# Patient Record
Sex: FEMALE | NOT HISPANIC OR LATINO | Employment: FULL TIME | URBAN - METROPOLITAN AREA
[De-identification: names, ages, dates, MRNs, and addresses within clinical notes are randomized per-mention and may not be internally consistent; named-entity substitution may affect disease eponyms.]

---

## 2024-09-11 ENCOUNTER — OFFICE VISIT (OUTPATIENT)
Dept: FAMILY MEDICINE | Facility: CLINIC | Age: 44
End: 2024-09-11
Payer: COMMERCIAL

## 2024-09-11 VITALS
RESPIRATION RATE: 16 BRPM | HEART RATE: 99 BPM | HEIGHT: 64 IN | SYSTOLIC BLOOD PRESSURE: 119 MMHG | WEIGHT: 166.2 LBS | BODY MASS INDEX: 28.38 KG/M2 | TEMPERATURE: 98.6 F | DIASTOLIC BLOOD PRESSURE: 82 MMHG | OXYGEN SATURATION: 100 %

## 2024-09-11 DIAGNOSIS — E78.5 DYSLIPIDEMIA: ICD-10-CM

## 2024-09-11 DIAGNOSIS — I10 BENIGN ESSENTIAL HYPERTENSION: ICD-10-CM

## 2024-09-11 DIAGNOSIS — E11.9 TYPE 2 DIABETES MELLITUS WITHOUT COMPLICATION, WITHOUT LONG-TERM CURRENT USE OF INSULIN (H): Primary | ICD-10-CM

## 2024-09-11 LAB — HBA1C MFR BLD: 8.8 % (ref 0–5.6)

## 2024-09-11 PROCEDURE — 99214 OFFICE O/P EST MOD 30 MIN: CPT | Performed by: INTERNAL MEDICINE

## 2024-09-11 PROCEDURE — 36415 COLL VENOUS BLD VENIPUNCTURE: CPT | Performed by: INTERNAL MEDICINE

## 2024-09-11 PROCEDURE — 80053 COMPREHEN METABOLIC PANEL: CPT | Performed by: INTERNAL MEDICINE

## 2024-09-11 PROCEDURE — 83036 HEMOGLOBIN GLYCOSYLATED A1C: CPT | Performed by: INTERNAL MEDICINE

## 2024-09-11 PROCEDURE — 80061 LIPID PANEL: CPT | Performed by: INTERNAL MEDICINE

## 2024-09-11 RX ORDER — LOSARTAN POTASSIUM 50 MG/1
50 TABLET ORAL DAILY
Status: SHIPPED
Start: 2024-09-11

## 2024-09-11 RX ORDER — BLOOD-GLUCOSE SENSOR
1 EACH MISCELLANEOUS
Qty: 6 EACH | Refills: 5 | Status: SHIPPED | OUTPATIENT
Start: 2024-09-11

## 2024-09-11 RX ORDER — ATORVASTATIN CALCIUM 10 MG/1
10 TABLET, FILM COATED ORAL DAILY
Status: SHIPPED
Start: 2024-09-11

## 2024-09-11 RX ORDER — KETOROLAC TROMETHAMINE 30 MG/ML
1 INJECTION, SOLUTION INTRAMUSCULAR; INTRAVENOUS ONCE
Qty: 1 EACH | Refills: 0 | Status: SHIPPED | OUTPATIENT
Start: 2024-09-11 | End: 2024-09-11

## 2024-09-11 ASSESSMENT — PAIN SCALES - GENERAL: PAINLEVEL: NO PAIN (0)

## 2024-09-11 NOTE — PROGRESS NOTES
Assessment & Plan     Type 2 diabetes mellitus without complication, without long-term current use of insulin (H)  She initially had gestational diabetes around 7 years ago when she had her son  After that she was diagnosed with diabetes  She has been very well-controlled with metformin 1 g twice a day but for the past few weeks she thinks her diabetes is out of control  Sometimes her sugars have been in the range of 200  She has just moved to [place] and is in the process of settling down  Because of this she has not had much time to do exercise  Her A1c is 8.8 today  Discussed about diet and exercise  We also reviewed GLP-1 agonist but she is not keen on those as she has history of corneal transplant and ophthalmology issues  Discussed about Januvia  Discussed about the side effects of the same  We will start that  And we will also get her a CGM   she needs to see a nutritionist  Consult diabetic education  - metFORMIN (GLUCOPHAGE) 1000 MG tablet; Take 1 tablet (1,000 mg) by mouth 2 times daily (with meals).  - Hemoglobin A1c; Future  - Comprehensive metabolic panel (BMP + Alb, Alk Phos, ALT, AST, Total. Bili, TP); Future  - Hemoglobin A1c  - Comprehensive metabolic panel (BMP + Alb, Alk Phos, ALT, AST, Total. Bili, TP)  - Continuous Glucose  (FREESTYLE ILENE 3 READER) SUMAN; 1 each once for 1 dose. Use to read blood sugars per 's instructions.  - Continuous Glucose Sensor (FREESTYLE ILENE 3 SENSOR) MISC; 1 each every 14 days. Use 1 sensor every 14 days. Use to read blood sugars per 's instructions.  - sitagliptin (JANUVIA) 100 MG tablet; Take 1 tablet (100 mg) by mouth daily.  - Adult Diabetes Education  Referral; Future    Benign essential hypertension  Blood pressure under good control with losartan  - losartan (COZAAR) 50 MG tablet; Take 1 tablet (50 mg) by mouth daily.    Dyslipidemia  She is on Lipitor  For the history of A-fib/atrial tachycardia status post  "ablation in the past and also  had a history of tachycardia mediated cardiomyopathy  - atorvastatin (LIPITOR) 10 MG tablet; Take 1 tablet (10 mg) by mouth daily.  - Lipid panel reflex to direct LDL Fasting; Future  - Lipid panel reflex to direct LDL Fasting          BMI  Estimated body mass index is 28.53 kg/m  as calculated from the following:    Height as of this encounter: 1.626 m (5' 4\").    Weight as of this encounter: 75.4 kg (166 lb 3.2 oz).   Weight management plan: Discussed healthy diet and exercise guidelines          Hema Horvath is a 43 year old, presenting for the following health issues:  Establish Care        9/11/2024     3:15 PM   Additional Questions   Roomed by Richard   Accompanied by Self         9/11/2024     3:15 PM   Patient Reported Additional Medications   Patient reports taking the following new medications None     History of Present Illness       Reason for visit:  Establishing pcp and annual blood work   She is taking medications regularly.                 Review of Systems  Constitutional, HEENT, cardiovascular, pulmonary, gi and gu systems are negative, except as otherwise noted.      Objective    /82 (BP Location: Right arm, Patient Position: Sitting, Cuff Size: Adult Regular)   Pulse 99   Temp 98.6  F (37  C) (Tympanic)   Resp 16   Ht 1.626 m (5' 4\")   Wt 75.4 kg (166 lb 3.2 oz)   LMP 08/28/2024 (Exact Date)   SpO2 100%   BMI 28.53 kg/m    Body mass index is 28.53 kg/m .  Physical Exam   GENERAL: alert and no distress  EYES: Eyes grossly normal to inspection, PERRL and conjunctivae and sclerae normal  MS: no gross musculoskeletal defects noted, no edema  SKIN: no suspicious lesions or rashes  NEURO: Normal strength and tone, mentation intact and speech normal  PSYCH: mentation appears normal, affect normal/bright            Signed Electronically by: Roman Parada MD    "

## 2024-09-11 NOTE — LETTER
September 12, 2024      Yuliet Amaya  [address]  [place] [address]        Ms. Amaya,   Kidney function tests are normal   Bicarbonate  that is the carbon oxide is slightly low but of not any concern as this can be sometimes seen when someone is anxious and hyperventilating   Liver function tests are normal   Glucose is 185 as expected   Cholesterol panel shows elevated triglycerides at 170 on low good cholesterol HDL at 27 but LDL is good at 36   The triglycerides will come down if you work on diet and exercise     Please contact the clinic if you have additional questions.  Thank you.     Sincerely,     Roman Parada MD     Resulted Orders   Hemoglobin A1c   Result Value Ref Range    Hemoglobin A1C 8.8 (H) 0.0 - 5.6 %      Comment:      Normal <5.7%   Prediabetes 5.7-6.4%    Diabetes 6.5% or higher     Note: Adopted from ADA consensus guidelines.    Narrative    Results confirmed by repeat test.     Comprehensive metabolic panel (BMP + Alb, Alk Phos, ALT, AST, Total. Bili, TP)   Result Value Ref Range    Sodium 137 135 - 145 mmol/L    Potassium 4.5 3.4 - 5.3 mmol/L    Carbon Dioxide (CO2) 21 (L) 22 - 29 mmol/L    Anion Gap 12 7 - 15 mmol/L    Urea Nitrogen 5.3 (L) 6.0 - 20.0 mg/dL    Creatinine 0.49 (L) 0.51 - 0.95 mg/dL    GFR Estimate >90 >60 mL/min/1.73m2      Comment:      eGFR calculated using 2021 CKD-EPI equation.    Calcium 9.0 8.8 - 10.4 mg/dL      Comment:      Reference intervals for this test were updated on 7/16/2024 to reflect our healthy population more accurately. There may be differences in the flagging of prior results with similar values performed with this method. Those prior results can be interpreted in the context of the updated reference intervals.    Chloride 104 98 - 107 mmol/L    Glucose 185 (H) 70 - 99 mg/dL    Alkaline Phosphatase 87 40 - 150 U/L    AST 14 0 - 45 U/L    ALT 9 0 - 50 U/L    Protein Total 7.8 6.4 - 8.3 g/dL    Albumin 4.1 3.5 - 5.2 g/dL    Bilirubin Total  0.3 <=1.2 mg/dL    Patient Fasting > 8hrs? No    Lipid panel reflex to direct LDL Fasting   Result Value Ref Range    Cholesterol 97 <200 mg/dL    Triglycerides 170 (H) <150 mg/dL    Direct Measure HDL 27 (L) >=50 mg/dL    LDL Cholesterol Calculated 36 <100 mg/dL    Non HDL Cholesterol 70 <130 mg/dL    Patient Fasting > 8hrs? No     Narrative    Cholesterol  Desirable: < 200 mg/dL  Borderline High: 200 - 239 mg/dL  High: >= 240 mg/dL    Triglycerides  Normal: < 150 mg/dL  Borderline High: 150 - 199 mg/dL  High: 200-499 mg/dL  Very High: >= 500 mg/dL    Direct Measure HDL  Female: >= 50 mg/dL   Male: >= 40 mg/dL    LDL Cholesterol  Desirable: < 100 mg/dL  Above Desirable: 100 - 129 mg/dL   Borderline High: 130 - 159 mg/dL   High:  160 - 189 mg/dL   Very High: >= 190 mg/dL    Non HDL Cholesterol  Desirable: < 130 mg/dL  Above Desirable: 130 - 159 mg/dL  Borderline High: 160 - 189 mg/dL  High: 190 - 219 mg/dL  Very High: >= 220 mg/dL

## 2024-09-12 LAB
ALBUMIN SERPL BCG-MCNC: 4.1 G/DL (ref 3.5–5.2)
ALP SERPL-CCNC: 87 U/L (ref 40–150)
ALT SERPL W P-5'-P-CCNC: 9 U/L (ref 0–50)
ANION GAP SERPL CALCULATED.3IONS-SCNC: 12 MMOL/L (ref 7–15)
AST SERPL W P-5'-P-CCNC: 14 U/L (ref 0–45)
BILIRUB SERPL-MCNC: 0.3 MG/DL
BUN SERPL-MCNC: 5.3 MG/DL (ref 6–20)
CALCIUM SERPL-MCNC: 9 MG/DL (ref 8.8–10.4)
CHLORIDE SERPL-SCNC: 104 MMOL/L (ref 98–107)
CHOLEST SERPL-MCNC: 97 MG/DL
CREAT SERPL-MCNC: 0.49 MG/DL (ref 0.51–0.95)
EGFRCR SERPLBLD CKD-EPI 2021: >90 ML/MIN/1.73M2
FASTING STATUS PATIENT QL REPORTED: NO
FASTING STATUS PATIENT QL REPORTED: NO
GLUCOSE SERPL-MCNC: 185 MG/DL (ref 70–99)
HCO3 SERPL-SCNC: 21 MMOL/L (ref 22–29)
HDLC SERPL-MCNC: 27 MG/DL
LDLC SERPL CALC-MCNC: 36 MG/DL
NONHDLC SERPL-MCNC: 70 MG/DL
POTASSIUM SERPL-SCNC: 4.5 MMOL/L (ref 3.4–5.3)
PROT SERPL-MCNC: 7.8 G/DL (ref 6.4–8.3)
SODIUM SERPL-SCNC: 137 MMOL/L (ref 135–145)
TRIGL SERPL-MCNC: 170 MG/DL

## 2024-09-20 ENCOUNTER — VIRTUAL VISIT (OUTPATIENT)
Dept: EDUCATION SERVICES | Facility: CLINIC | Age: 44
End: 2024-09-20
Attending: INTERNAL MEDICINE
Payer: COMMERCIAL

## 2024-09-20 DIAGNOSIS — E11.9 TYPE 2 DIABETES MELLITUS WITHOUT COMPLICATION, WITHOUT LONG-TERM CURRENT USE OF INSULIN (H): ICD-10-CM

## 2024-09-20 PROCEDURE — G0108 DIAB MANAGE TRN  PER INDIV: HCPCS | Mod: 93 | Performed by: DIETITIAN, REGISTERED

## 2024-09-20 NOTE — PATIENT INSTRUCTIONS
"Download Ching 3 lauren and start sensor. To connect to the [facility]-->open the freestyle ching 3 lauren-->click on the three bars in the upper left hand corner-->scroll down to and click on \"connected apps\"-->click the blue connect button across from the [employer]w symbol-->enter the practice ID#[id number]-->join practice.   Nutrition goals: limit carbohydrates to 3-45 grams per meal and 15 grams per snack. Add in non-starchy vegetables with meals and a protein source.   Aim for a minimum of 60 oz of water on a daily basis.  Cookbook Resource: My Ethiopian Table by Bria Lomeli, she is a registered dietitian and diabetes educator. [email or web address]  Aim for a 5% reduction in weight over the next 3-6 months.  Goal for A1C is to reduce to < 7%.  Look at local gyms and community centers near your home to consider for membership.   Schedule a diabetic eye exam and dental cleaning if you have not had them completed in the past 12 months.   Take medications as prescribed. Note bowel habits after taking metformin.   "

## 2024-09-20 NOTE — LETTER
9/20/2024         RE: Yuliet Amaya  [address]  [place] [address]        Dear Colleague,    Thank you for referring your patient, Yuliet Amaya, to the [facility]. Please see a copy of my visit note below.    Diabetes Self-Management Education & Support/ 69 minutes    Presents for: Initial Assessment for new diagnosis    Type of Service: Telephone Visit    Originating Location (Patient Location): Home  Distant Location (Provider Location): [facility]  Mode of Communication:  Telephone    Telephone Visit Start Time:  12:55  Telephone Visit End Time (telephone visit stop time): 1:38    How would patient like to obtain AVS? Mail a copy      ASSESSMENT:  Initial DM education for new diagnosis 9/11/24. Yuliet has a history of GDM. She is currently taking Metformin 2000 mg, she has not been able to start Januvia due to cost. She is not currently checking BG at home, although she just received cora 3 sensors and plans to download the lauren and get started this weekend. Yuliet moved to [place] two weeks ago, she is in the midst of unpacking and she starts a new job on 9/23/24. She is currently not exercising, but hopes to find a gym nearby to join. Her stress level has been high with her recent move.Yuliet is consuming two meals/day, rice is a staple, but for the last week she has substitued whole grain tortillas at her meals. She is following a vegetarian meal plan: she does consume some milk in tea, cheese and butter, no other animnal products. Her main source of protein is from lentils and nuts, occasionally homemade paneer. Yuliet limits sweets to every 2-3 weeks, she does not drink sweet beverages. She has 1-2 tsp of sugar in her tea or coffee in the morning and at times in the afternoon. Yuliet does not use tobacco or ETOH products. She desires weight loss of 30#, per PCP she declined using a GLP1 at this time.     Patient's most recent   Lab Results  "  Component Value Date    A1C 8.8 09/11/2024     is not meeting goal of <8.0    Diabetes knowledge and skills assessment:   Patient is knowledgeable in diabetes management concepts related to: new diagnosis    Continue education with the following diabetes management concepts: Healthy Eating, Being Active, Monitoring, Taking Medication, and Reducing Risks    Based on learning assessment above, most appropriate setting for further diabetes education would be: Individual setting.      PLAN  Download [email or web address] 3 lauren and start sensor. To connect to the [facility]-->open the freestyle cora 3 lauren-->click on the three bars in the upper left hand corner-->scroll down to and click on \"connected apps\"-->click the blue connect button across from the Schedul[employer] symbol-->enter the practice ID#[id number]-->join practice.   Nutrition goals: limit carbohydrates to 3-45 grams per meal and 15 grams per snack. Add in non-starchy vegetables with meals and a protein source.   Aim for a minimum of 60 oz of water on a daily basis.  Cookbook Resource: My  Table by Bria Lomeli, she is a registered dietitian and diabetes educator. https://www.[employer]/  Aim for a 5% reduction in weight over the next 3-6 months.  Goal for A1C is to reduce to < 7%.  Look at local gyms and community centers near your home to consider for membership.   Schedule a diabetic eye exam and dental cleaning if you have not had them completed in the past 12 months.   Take medications as prescribed. Note bowel habits after taking metformin.     Topics to cover at upcoming visits: Healthy Eating, Being Active, Monitoring, Taking Medication, and Reducing Risks    Follow-up: 10/254/24    See Care Plan for co-developed, patient-state behavior change goals.  AVS provided for patient today.    Education Materials Provided:  Living Healthy with Diabetes, Carbohydrate Counting, My Plate Planner, and cho counting for traditional [place] " "foods      SUBJECTIVE/OBJECTIVE:  Presents for: Initial Assessment for new diagnosis  Accompanied by: Self  Focus of Visit: Monitoring, Taking Medication, Healthy Eating, Being Active  Diabetes type: Type 2  Date of diagnosis: 9/11/24  Transportation concerns: No  Other concerns:: None  Cultural Influences/Ethnic Background:  Not  or       Diabetes Symptoms & Complications:          Patient Problem List and Family Medical History reviewed for relevant medical history, current medical status, and diabetes risk factors.    Vitals:  LMP 08/28/2024 (Exact Date)   Estimated body mass index is 28.53 kg/m  as calculated from the following:    Height as of 9/11/24: 1.626 m (5' 4\").    Weight as of 9/11/24: 75.4 kg (166 lb 3.2 oz).   Last 3 BP:   BP Readings from Last 3 Encounters:   09/11/24 119/82       History   Smoking Status     Never   Smokeless Tobacco     Never       Labs:  Lab Results   Component Value Date    A1C 8.8 09/11/2024     Lab Results   Component Value Date     09/11/2024     Lab Results   Component Value Date    LDL 36 09/11/2024     Direct Measure HDL   Date Value Ref Range Status   09/11/2024 27 (L) >=50 mg/dL Final   ]  GFR Estimate   Date Value Ref Range Status   09/11/2024 >90 >60 mL/min/1.73m2 Final     Comment:     eGFR calculated using 2021 CKD-EPI equation.     No results found for: \"GFRESTBLACK\"  Lab Results   Component Value Date    CR 0.49 09/11/2024     No results found for: \"MICROALBUMIN\"    Healthy Eating:  Healthy Eating Assessed Today: Yes  Meals include: Breakfast, Lunch, Dinner  Breakfast: rarely had breakfast,  1 small cup of tea or coffee with 1-2tsp sugar  Lunch: 1-2:30pm: used to have a bigger meal-rice/curries/soups-vegetables or lentils, pickles/salt  last two weeks: 2-3 flour tortillas/salad(nuts), soup: tomato, pumpkin,  Dinner: simliar to noon meal, maybe a different correa  Snacks: 1 small cup of tea or coffee with 1-2 tsp sugar  Other: vegetarian: has some " milk in tea, some cheese, reduced caffeine, no more than one cup coffee/day, tried stevia, did not like it, cookies once in awhile  Beverages: Tea, Coffee    Being Active:  Being Active Assessed Today: Yes  Exercise:: Currently not exercising    Monitoring:  Monitoring Assessed Today: Yes (she is currently not checking BG at home, she just received cora 3 sensors and will start this weekend)  Did patient bring glucose meter to appointment? : No    NA    Taking Medications:  Diabetes Medication(s)       Biguanides       metFORMIN (GLUCOPHAGE) 1000 MG tablet Take 1 tablet (1,000 mg) by mouth 2 times daily (with meals).       Dipeptidyl Peptidase-4 (DPP-4) Inhibitors       sitagliptin (JANUVIA) 100 MG tablet Take 1 tablet (100 mg) by mouth daily.            Taking Medication Assessed Today: Yes  Current Treatments: Diet, Oral Medication (taken by mouth)  Problems taking diabetes medications regularly?: Yes (Januvia cost > $500, she will be receiving this medication from [facility])    Problem Solving:  Problem Solving Assessed Today: Yes  Is the patient at risk for hypoglycemia?: No              Reducing Risks:  Reducing Risks Assessed Today: Yes  Diabetes Risks: Age over 45 years, Sedentary Lifestyle, Ethnicity, History of gestational diabetes  Additional female risks: Gestational Diabetes    Healthy Coping:  Healthy Coping Assessed Today: Yes  Emotional response to diabetes: Ready to learn  Stage of change: PREPARATION (Decided to change - considering how)  Patient Activation Measure Survey Score:       No data to display                  Care Plan and Education Provided:  Healthy Eating: Carbohydrate Counting, Label reading, and Portion control, Being Active: Finding a physical activity routine that works for you, Monitoring: Blood glucose versus Continuous Glucose Monitoring, Taking Medication: Action of prescribed medication(s) and When to take medication(s), and Reducing Risks: Goal for A1c, how it relates to glucose  and how often to check        Time Spent: 69 minutes  Encounter Type: Individual    Any diabetes medication dose changes were made via the CDE Protocol per the patient's primary care provider. A copy of this encounter was shared with the provider.

## 2024-09-20 NOTE — PROGRESS NOTES
Diabetes Self-Management Education & Support/ 69 minutes    Presents for: Initial Assessment for new diagnosis    Type of Service: Telephone Visit    Originating Location (Patient Location): Home  Distant Location (Provider Location): [facility]  Mode of Communication:  Telephone    Telephone Visit Start Time:  12:55  Telephone Visit End Time (telephone visit stop time): 1:38    How would patient like to obtain AVS? Mail a copy      ASSESSMENT:  Initial DM education for new diagnosis 9/11/24. Yuliet has a history of GDM. She is currently taking Metformin 2000 mg, she has not been able to start Januvia due to cost. She is not currently checking BG at home, although she just received cora 3 sensors and plans to download the lauren and get started this weekend. Yuliet moved to [place] two weeks ago, she is in the midst of unpacking and she starts a new job on 9/23/24. She is currently not exercising, but hopes to find a gym nearby to join. Her stress level has been high with her recent move.Yuliet is consuming two meals/day, rice is a staple, but for the last week she has substitued whole grain tortillas at her meals. She is following a vegetarian meal plan: she does consume some milk in tea, cheese and butter, no other animnal products. Her main source of protein is from lentils and nuts, occasionally homemade paneer. Yuliet limits sweets to every 2-3 weeks, she does not drink sweet beverages. She has 1-2 tsp of sugar in her tea or coffee in the morning and at times in the afternoon. Yuliet does not use tobacco or ETOH products. She desires weight loss of 30#, per PCP she declined using a GLP1 at this time.     Patient's most recent   Lab Results   Component Value Date    A1C 8.8 09/11/2024     is not meeting goal of <8.0    Diabetes knowledge and skills assessment:   Patient is knowledgeable in diabetes management concepts related to: new diagnosis    Continue education with the following diabetes  "management concepts: Healthy Eating, Being Active, Monitoring, Taking Medication, and Reducing Risks    Based on learning assessment above, most appropriate setting for further diabetes education would be: Individual setting.      PLAN  Download [employer] 3 lauren and start sensor. To connect to the [facility]-->open the freestyle cora 3 lauren-->click on the three bars in the upper left hand corner-->scroll down to and click on \"connected apps\"-->click the blue connect button across from the [employer] symbol-->enter the practice ID#[id number]-->join practice.   Nutrition goals: limit carbohydrates to 3-45 grams per meal and 15 grams per snack. Add in non-starchy vegetables with meals and a protein source.   Aim for a minimum of 60 oz of water on a daily basis.  Cookbook Resource: My  Table by Bria Lomeli, she is a registered dietitian and diabetes educator. [email or web address]/  Aim for a 5% reduction in weight over the next 3-6 months.  Goal for A1C is to reduce to < 7%.  Look at local gyms and community centers near your home to consider for membership.   Schedule a diabetic eye exam and dental cleaning if you have not had them completed in the past 12 months.   Take medications as prescribed. Note bowel habits after taking metformin.     Topics to cover at upcoming visits: Healthy Eating, Being Active, Monitoring, Taking Medication, and Reducing Risks    Follow-up: 10/254/24    See Care Plan for co-developed, patient-state behavior change goals.  AVS provided for patient today.    Education Materials Provided:  Living Healthy with Diabetes, Carbohydrate Counting, My Plate Planner, and cho counting for traditional /pak[place] foods      SUBJECTIVE/OBJECTIVE:  Presents for: Initial Assessment for new diagnosis  Accompanied by: Self  Focus of Visit: Monitoring, Taking Medication, Healthy Eating, Being Active  Diabetes type: Type 2  Date of diagnosis: 9/11/24  Transportation concerns: " "No  Other concerns:: None  Cultural Influences/Ethnic Background:  Not  or       Diabetes Symptoms & Complications:          Patient Problem List and Family Medical History reviewed for relevant medical history, current medical status, and diabetes risk factors.    Vitals:  LMP 08/28/2024 (Exact Date)   Estimated body mass index is 28.53 kg/m  as calculated from the following:    Height as of 9/11/24: 1.626 m (5' 4\").    Weight as of 9/11/24: 75.4 kg (166 lb 3.2 oz).   Last 3 BP:   BP Readings from Last 3 Encounters:   09/11/24 119/82       History   Smoking Status    Never   Smokeless Tobacco    Never       Labs:  Lab Results   Component Value Date    A1C 8.8 09/11/2024     Lab Results   Component Value Date     09/11/2024     Lab Results   Component Value Date    LDL 36 09/11/2024     Direct Measure HDL   Date Value Ref Range Status   09/11/2024 27 (L) >=50 mg/dL Final   ]  GFR Estimate   Date Value Ref Range Status   09/11/2024 >90 >60 mL/min/1.73m2 Final     Comment:     eGFR calculated using 2021 CKD-EPI equation.     No results found for: \"GFRESTBLACK\"  Lab Results   Component Value Date    CR 0.49 09/11/2024     No results found for: \"MICROALBUMIN\"    Healthy Eating:  Healthy Eating Assessed Today: Yes  Meals include: Breakfast, Lunch, Dinner  Breakfast: rarely had breakfast,  1 small cup of tea or coffee with 1-2tsp sugar  Lunch: 1-2:30pm: used to have a bigger meal-rice/curries/soups-vegetables or lentils, pickles/salt  last two weeks: 2-3 flour tortillas/salad(nuts), soup: tomato, pumpkin,  Dinner: simliar to noon meal, maybe a different correa  Snacks: 1 small cup of tea or coffee with 1-2 tsp sugar  Other: vegetarian: has some milk in tea, some cheese, reduced caffeine, no more than one cup coffee/day, tried stevia, did not like it, cookies once in awhile  Beverages: Tea, Coffee    Being Active:  Being Active Assessed Today: Yes  Exercise:: Currently not " exercising    Monitoring:  Monitoring Assessed Today: Yes (she is currently not checking BG at home, she just received cora 3 sensors and will start this weekend)  Did patient bring glucose meter to appointment? : No    NA    Taking Medications:  Diabetes Medication(s)       Biguanides       metFORMIN (GLUCOPHAGE) 1000 MG tablet Take 1 tablet (1,000 mg) by mouth 2 times daily (with meals).       Dipeptidyl Peptidase-4 (DPP-4) Inhibitors       sitagliptin (JANUVIA) 100 MG tablet Take 1 tablet (100 mg) by mouth daily.            Taking Medication Assessed Today: Yes  Current Treatments: Diet, Oral Medication (taken by mouth)  Problems taking diabetes medications regularly?: Yes (Januvia cost > $500, she will be receiving this medication from [facility])    Problem Solving:  Problem Solving Assessed Today: Yes  Is the patient at risk for hypoglycemia?: No              Reducing Risks:  Reducing Risks Assessed Today: Yes  Diabetes Risks: Age over 45 years, Sedentary Lifestyle, Ethnicity, History of gestational diabetes  Additional female risks: Gestational Diabetes    Healthy Coping:  Healthy Coping Assessed Today: Yes  Emotional response to diabetes: Ready to learn  Stage of change: PREPARATION (Decided to change - considering how)  Patient Activation Measure Survey Score:       No data to display                  Care Plan and Education Provided:  Healthy Eating: Carbohydrate Counting, Label reading, and Portion control, Being Active: Finding a physical activity routine that works for you, Monitoring: Blood glucose versus Continuous Glucose Monitoring, Taking Medication: Action of prescribed medication(s) and When to take medication(s), and Reducing Risks: Goal for A1c, how it relates to glucose and how often to check        Time Spent: 69 minutes  Encounter Type: Individual    Any diabetes medication dose changes were made via the CDE Protocol per the patient's primary care provider. A copy of this encounter was shared  with the provider.

## 2024-12-31 ENCOUNTER — TELEPHONE (OUTPATIENT)
Dept: FAMILY MEDICINE | Facility: CLINIC | Age: 44
End: 2024-12-31
Payer: COMMERCIAL

## 2024-12-31 NOTE — LETTER
December 31, 2024    Yuliet Amaya  [address]  [place] [address]    Dear Yuliet,    At [facility] we care about your health and are committed to providing quality patient care.     Here is a list of Health Maintenance topics that are due now or due soon:  Health Maintenance Due   Topic Date Due    MICROALBUMIN  Never done    DIABETIC FOOT EXAM  Never done    ANNUAL REVIEW OF HM ORDERS  Never done    ADVANCE CARE PLANNING  Never done    YEARLY PREVENTIVE VISIT  Never done    COLORECTAL CANCER SCREENING  Never done    HIV SCREENING  Never done    HEPATITIS C SCREENING  Never done    Pneumococcal Vaccine: Pediatrics (0 to 5 Years) and At-Risk Patients (6 to 49 Years) (1 of 2 - PCV) Never done    HEPATITIS B IMMUNIZATION (1 of 3 - 19+ 3-dose series) Never done    PAP  Never done    DTAP/TDAP/TD IMMUNIZATION (1 - Tdap) Never done    INFLUENZA VACCINE (1) 09/01/2024    COVID-19 Vaccine (1 - 2024-25 season) Never done    A1C  12/11/2024        We are recommending that you:  Schedule a WELLNESS (Preventative/Physical) APPOINTMENT with your primary care provider. If you go elsewhere for your wellness appointments then please disregard this reminder    ,   Schedule a COLONOSCOPY to assess for colon cancer (due every 10 years or 5 years in higher risk situations.)       Colon cancer is now the second leading cause of cancer-related deaths in the [place] for both men and women and there are over 130,000 new cases and 50,000 deaths per year from colon cancer.  Colonoscopies can prevent 90-95% of these deaths.  Problem lesions can be removed before they ever become cancer.  This test is not only looking for cancer, but also getting rid of precancerious lesions.    If you are under/uninsured, we recommend you contact the [employer] program. [employer] is a free colorectal cancer screening program that provides colonoscopies for eligible under/uninsured [place] men and women. If you  are interested in receiving a free colonoscopy, please call [facility] at [phone] (mention code [employer]) to see if you re eligible.     If you do not wish to do a colonoscopy or cannot afford to do one, at this time, there is another option. It is called a FIT test or Fecal Immunochemical Occult Blood Test (take home stool sample kit).  It does not replace the colonoscopy for colorectal cancer screening, but it can detect hidden bleeding in the lower colon.  It does need to be repeated every year and if a positive result is obtained, you would be referred for a colonoscopy.    If you have completed either one of these tests at another facility, please call/respond to this message with the details of when and where the tests were done and if they were normal or not. Or have the records sent to our clinic so that we can best coordinate your care.  ,   Schedule a PAP SMEAR EXAM which is due.  Please disregard this reminder if you have had this exam elsewhere within the last year.  It would be helpful for us to have a copy of your recent pap smear report in our file so that we can best coordinate your care.    If you are under/uninsured, we recommend you contact the [facility]. They offer pap smears at no charge or on a sliding fee charge. You can schedule with them at [phone]. Please have them send us the results.    ,    Schedule a Diabetes Follow-Up Office Appointment: You are due to be seen with your primary care provider for a diabetes follow up office appointment. Please schedule this as soon as you are able.    , and   Schedule a Nurse-Only appointment to update your immunizations: Your records indicate that you are not up to date with your immunizations, please schedule a nurse-only appointment to get these updated or update them at your next office visit. If this is incorrect, please disregard.    To schedule an appointment or discuss this further, you may contact us by phone at the [facility] at [phone] or online through the [email or web address] @ [email or web address]    Thank you for trusting [facility] and we appreciate the opportunity to serve you.  We look forward to supporting your healthcare needs in the future.    Your partners in health,      Quality Committee at [facility]          Electronically signed

## 2024-12-31 NOTE — TELEPHONE ENCOUNTER
Patient Quality Outreach    Patient is due for the following:   Diabetes -  A1C, Microalbumin, and Foot Exam  Colon Cancer Screening  Cervical Cancer Screening - PAP Needed  Physical Preventive Adult Physical      Topic Date Due    Pneumococcal Vaccine (1 of 2 - PCV) Never done    Hepatitis B Vaccine (1 of 3 - 19+ 3-dose series) Never done    Diptheria Tetanus Pertussis (DTAP/TDAP/TD) Vaccine (1 - Tdap) Never done    Flu Vaccine (1) 09/01/2024    COVID-19 Vaccine (1 - 2024-25 season) Never done       Action(s) Taken:   Schedule a Adult Preventative    Type of outreach:    Sent letter.    Questions for provider review:    None           Hilaria Austin

## 2025-05-28 ENCOUNTER — MYC MEDICAL ADVICE (OUTPATIENT)
Dept: FAMILY MEDICINE | Facility: CLINIC | Age: 45
End: 2025-05-28
Payer: COMMERCIAL

## 2025-05-28 DIAGNOSIS — E11.9 TYPE 2 DIABETES MELLITUS WITHOUT COMPLICATION, WITHOUT LONG-TERM CURRENT USE OF INSULIN (H): Primary | ICD-10-CM

## 2025-05-29 RX ORDER — HYDROCHLOROTHIAZIDE 12.5 MG/1
CAPSULE ORAL
Qty: 6 EACH | Refills: 3 | Status: SHIPPED | OUTPATIENT
Start: 2025-05-29

## 2025-05-29 RX ORDER — KETOROLAC TROMETHAMINE 30 MG/ML
1 INJECTION, SOLUTION INTRAMUSCULAR; INTRAVENOUS ONCE
Qty: 1 EACH | Refills: 0 | Status: SHIPPED | OUTPATIENT
Start: 2025-05-29 | End: 2025-05-29

## 2025-06-08 ENCOUNTER — HEALTH MAINTENANCE LETTER (OUTPATIENT)
Age: 45
End: 2025-06-08

## 2025-07-29 ENCOUNTER — TELEPHONE (OUTPATIENT)
Dept: FAMILY MEDICINE | Facility: CLINIC | Age: 45
End: 2025-07-29
Payer: COMMERCIAL

## 2025-07-29 DIAGNOSIS — E11.9 TYPE 2 DIABETES MELLITUS WITHOUT COMPLICATION, WITHOUT LONG-TERM CURRENT USE OF INSULIN (H): ICD-10-CM

## 2025-07-29 DIAGNOSIS — E78.5 DYSLIPIDEMIA: ICD-10-CM

## 2025-07-29 DIAGNOSIS — I10 BENIGN ESSENTIAL HYPERTENSION: ICD-10-CM

## 2025-07-29 RX ORDER — ATORVASTATIN CALCIUM 10 MG/1
10 TABLET, FILM COATED ORAL DAILY
Qty: 90 TABLET | Refills: 0 | Status: SHIPPED | OUTPATIENT
Start: 2025-07-29

## 2025-07-29 RX ORDER — LOSARTAN POTASSIUM 50 MG/1
50 TABLET ORAL DAILY
Qty: 90 TABLET | Refills: 0 | Status: SHIPPED | OUTPATIENT
Start: 2025-07-29

## 2025-07-29 NOTE — TELEPHONE ENCOUNTER
Called patient back. She reported that she needs refills for Losartan, Atorvastatin, and Metformin. Only has 1-2 days left of prescriptions. Please see separate encounter from today, 7/29/25.    Closing this encounter.     SAMEER Kaplan, RN   [facility] [place] [facility]

## 2025-07-29 NOTE — TELEPHONE ENCOUNTER
General Call      Reason for Call:  pt    What are your questions or concerns:  pt needs a call back to discuss meds    Date of last appointment with provider: na    Could we send this information to you in [employer] or would you prefer to receive a phone call?:   Patient would prefer a phone call   Okay to leave a detailed message?: no at Cell number on file:    Telephone Information:   Mobile [phone]

## 2025-07-29 NOTE — TELEPHONE ENCOUNTER
Called and spoke with patient. She reports that she only has 1-2 days left of prescriptions. Will route requests to refill pool as high priority. Patient moved to [place] last fall and established care with Dr. Parada.     Demetra Day, CODYN, RN   [facility]

## 2025-07-29 NOTE — TELEPHONE ENCOUNTER
Medication Question or Refill    3 medications      What medication are you calling about (include dose and sig)?: losartan (COZAAR) 50 MG tablet   metFORMIN (GLUCOPHAGE) 1000 M  atorvastatin (LIPITOR) 10 MG table     Preferred Pharmacy:[id number] - [place], [place] - [address] AT [facility] & [address] [place] [address]  Phone: [phone] Fax: [phone]      Controlled Substance Agreement on file:   CSA -- Patient Level:    CSA: None found at the patient level.       Who prescribed the medication?: Dr Parada    Do you need a refill? Yes    When did you use the medication last? daily    Patient offered an appointment? No    Do you have any questions or concerns?  No      Could we send this information to you in [employer] or would you prefer to receive a phone call?:   Patient would prefer a phone call   Okay to leave a detailed message?: Yes at Cell number on file:    Telephone Information:   Mobile [phone]

## 2025-09-03 DIAGNOSIS — E11.9 TYPE 2 DIABETES MELLITUS WITHOUT COMPLICATION, WITHOUT LONG-TERM CURRENT USE OF INSULIN (H): ICD-10-CM
